# Patient Record
Sex: FEMALE | Race: WHITE | NOT HISPANIC OR LATINO | URBAN - METROPOLITAN AREA
[De-identification: names, ages, dates, MRNs, and addresses within clinical notes are randomized per-mention and may not be internally consistent; named-entity substitution may affect disease eponyms.]

---

## 2023-03-08 ENCOUNTER — OFFICE VISIT (OUTPATIENT)
Dept: URGENT CARE | Facility: CLINIC | Age: 43
End: 2023-03-08

## 2023-03-08 VITALS
TEMPERATURE: 98.7 F | WEIGHT: 124 LBS | SYSTOLIC BLOOD PRESSURE: 116 MMHG | OXYGEN SATURATION: 97 % | HEART RATE: 85 BPM | DIASTOLIC BLOOD PRESSURE: 75 MMHG | RESPIRATION RATE: 18 BRPM | HEIGHT: 64 IN | BODY MASS INDEX: 21.17 KG/M2

## 2023-03-08 DIAGNOSIS — J06.9 ACUTE URI: Primary | ICD-10-CM

## 2023-03-08 LAB
SARS-COV-2 AG UPPER RESP QL IA: NEGATIVE
VALID CONTROL: NORMAL

## 2023-03-08 RX ORDER — METFORMIN HYDROCHLORIDE 500 MG/1
500 TABLET, EXTENDED RELEASE ORAL 2 TIMES DAILY WITH MEALS
COMMUNITY
Start: 2022-12-21

## 2023-03-08 RX ORDER — FLUOXETINE HYDROCHLORIDE 20 MG/1
20 CAPSULE ORAL
COMMUNITY
Start: 2023-01-30

## 2023-03-08 RX ORDER — LEVOTHYROXINE, LIOTHYRONINE 19; 4.5 UG/1; UG/1
30 TABLET ORAL DAILY
COMMUNITY
Start: 2022-12-21

## 2023-03-08 RX ORDER — CLONAZEPAM 1 MG/1
.5-1 TABLET ORAL DAILY PRN
COMMUNITY
Start: 2023-02-23

## 2023-03-08 RX ORDER — TRAZODONE HYDROCHLORIDE 50 MG/1
25-50 TABLET ORAL
COMMUNITY
Start: 2023-01-30

## 2023-03-08 RX ORDER — ERGOCALCIFEROL 1.25 MG/1
50000 CAPSULE ORAL WEEKLY
COMMUNITY
Start: 2022-12-21

## 2023-03-08 RX ORDER — SPIRONOLACTONE 100 MG/1
100 TABLET, FILM COATED ORAL 2 TIMES DAILY
COMMUNITY
Start: 2022-12-21

## 2023-03-08 NOTE — LETTER
March 8, 2023     Patient: Chidi Conroy   YOB: 1980   Date of Visit: 3/8/2023       To Whom it May Concern:    Chidi Conroy was seen in my clinic on 3/8/2023  Please excuse from work for 3/8 and 3/9  If you have any questions or concerns, please don't hesitate to call           Sincerely,          Job Alan MD        CC: No Recipients

## 2023-03-08 NOTE — PROGRESS NOTES
[facility]        NAME: Eyal Miner is a 38 y o  female  : 1980    MRN: [id number]  DATE: 2023  TIME: 3:50 PM    Assessment and Plan   Acute URI [J06 9]  1  Acute URI  Poct Covid 19 Rapid Antigen Test            Patient Instructions     Patient Instructions   Acute viral URI   - COVID-19 test performed in office is negative   - there are no signs of a bacterial infection present on exam time   - rest and drink plenty of fluids  - take Tylenol or Motrin as needed   - advised warm salt water gargles and throat lozenges as needed   - drink warm tea w/ lemon and honey   - run a humidifier at home   - advised using Flonase nasal spray   - if symptoms persist despite treatment, worsen, or any new symptoms present, should be seen by PCP for re-check       Follow up with PCP in 3-5 days  Proceed to  ER if symptoms worsen  Chief Complaint     Chief Complaint   Patient presents with   • Cold Like Symptoms     began Saturday:  sore throat, sinus pressure, low grade fever (highest 100), cough  History of Present Illness       39 yo female presents c/o sinus pressure, nasal congestion, rhinorrhea, post-nasal drip, sore throat, and cough  She has been ill x 4 days  She has felt feverish with chills, (Tmax-100), headache, and slight body aches  No chest pain, SOB, or wheezing  Non-smoker  No GI sx  No skin rashes  No loss of taste or smell  No recent travel or known exposure to anyone with COVID-19  Patient has received the COVID-19 vaccine  She is agreeable to testing for COVID-19 today  She has no known allergies  She denies any chance or pregnancy  She has taken Aleve and Aspirin for the symptoms  Rapid COVID-19 testing performed in office today is negative  Review of Systems   Review of Systems   Constitutional:        As noted in HPI   HENT:        As noted in HPI   Eyes: Negative  Respiratory:        As noted in HPI   Cardiovascular: Negative      Gastrointestinal: Negative  Musculoskeletal:        As noted in HPI   Skin: Negative  Allergic/Immunologic: Negative  Neurological: Negative  Hematological: Negative  Current Medications       Current Outpatient Medications:   •  clonazePAM (KlonoPIN) 1 mg tablet, Take 0 5-1 mg by mouth daily as needed, Disp: , Rfl:   •  ergocalciferol (VITAMIN D2) 50,000 units, Take 50,000 Units by mouth once a week, Disp: , Rfl:   •  FLUoxetine (PROzac) 20 mg capsule, Take 20 mg by mouth daily at bedtime, Disp: , Rfl:   •  metFORMIN (GLUCOPHAGE-XR) 500 mg 24 hr tablet, Take 500 mg by mouth 2 (two) times a day with meals, Disp: , Rfl:   •  NP Thyroid 30 MG tablet, Take 30 mg by mouth daily, Disp: , Rfl:   •  spironolactone (ALDACTONE) 100 mg tablet, Take 100 mg by mouth 2 (two) times a day, Disp: , Rfl:   •  traZODone (DESYREL) 50 mg tablet, Take 25-50 mg by mouth daily at bedtime, Disp: , Rfl:     Current Allergies     Allergies as of 03/08/2023   • (No Known Allergies)            The following portions of the patient's history were reviewed and updated as appropriate: allergies, current medications, past family history, past medical history, past social history, past surgical history and problem list      Past Medical History:   Diagnosis Date   • Disease of thyroid gland        History reviewed  No pertinent surgical history  Family History   Problem Relation Age of Onset   • Hypertension Mother    • Heart disease Father          Medications have been verified  Objective   /75   Pulse 85   Temp 98 7 °F (37 1 °C) (Temporal)   Resp 18   Ht 5' 4" (1 626 m)   Wt 56 2 kg (124 lb)   LMP 02/08/2023 (Approximate)   SpO2 97%   BMI 21 28 kg/m²   Patient's last menstrual period was 02/08/2023 (approximate)  Physical Exam     Physical Exam  Vitals and nursing note reviewed  Constitutional:       General: She is awake  She is not in acute distress  Appearance: Normal appearance   She is well-developed and well-groomed  She is not ill-appearing, toxic-appearing or diaphoretic  HENT:      Head: Normocephalic and atraumatic  Jaw: There is normal jaw occlusion  Right Ear: Tympanic membrane, ear canal and external ear normal       Left Ear: Tympanic membrane, ear canal and external ear normal       Nose: Mucosal edema and congestion present  Right Sinus: Maxillary sinus tenderness and frontal sinus tenderness present  Left Sinus: Maxillary sinus tenderness and frontal sinus tenderness present  Mouth/Throat:      Lips: Pink  No lesions  Mouth: Mucous membranes are moist       Pharynx: Oropharynx is clear  Uvula midline  Eyes:      General: Lids are normal       Conjunctiva/sclera: Conjunctivae normal    Neck:      Trachea: Trachea and phonation normal    Cardiovascular:      Rate and Rhythm: Normal rate and regular rhythm  Pulses: Normal pulses  Heart sounds: Normal heart sounds  Pulmonary:      Effort: Pulmonary effort is normal  No tachypnea, accessory muscle usage or respiratory distress  Breath sounds: Normal breath sounds and air entry  Musculoskeletal:      Cervical back: Neck supple  No edema, erythema, rigidity or tenderness  Lymphadenopathy:      Cervical: No cervical adenopathy  Skin:     General: Skin is warm and dry  Capillary Refill: Capillary refill takes less than 2 seconds  Coloration: Skin is not pale  Neurological:      Mental Status: She is alert and oriented to person, place, and time  Mental status is at baseline  Psychiatric:         Mood and Affect: Mood normal          Behavior: Behavior is cooperative  Thought Content:  Thought content normal          Judgment: Judgment normal

## 2023-03-08 NOTE — PATIENT INSTRUCTIONS
Acute viral URI   - COVID-19 test performed in office is negative   - there are no signs of a bacterial infection present on exam time   - rest and drink plenty of fluids  - take Tylenol or Motrin as needed   - advised warm salt water gargles and throat lozenges as needed   - drink warm tea w/ lemon and honey   - run a humidifier at home   - advised using Flonase nasal spray   - if symptoms persist despite treatment, worsen, or any new symptoms present, should be seen by PCP for re-check

## 2025-02-11 ENCOUNTER — TELEPHONE (OUTPATIENT)
Age: 45
End: 2025-02-11